# Patient Record
Sex: FEMALE | URBAN - METROPOLITAN AREA
[De-identification: names, ages, dates, MRNs, and addresses within clinical notes are randomized per-mention and may not be internally consistent; named-entity substitution may affect disease eponyms.]

---

## 2024-07-22 ENCOUNTER — APPOINTMENT (OUTPATIENT)
Dept: OBSTETRICS AND GYNECOLOGY | Facility: CLINIC | Age: 34
End: 2024-07-22
Payer: MEDICAID

## 2024-08-19 ENCOUNTER — APPOINTMENT (OUTPATIENT)
Dept: OBSTETRICS AND GYNECOLOGY | Facility: CLINIC | Age: 34
End: 2024-08-19
Payer: MEDICAID

## 2024-08-19 VITALS
HEIGHT: 62 IN | WEIGHT: 153 LBS | DIASTOLIC BLOOD PRESSURE: 60 MMHG | BODY MASS INDEX: 28.16 KG/M2 | SYSTOLIC BLOOD PRESSURE: 100 MMHG

## 2024-08-19 DIAGNOSIS — N63.20 MASS OF LEFT BREAST, UNSPECIFIED QUADRANT: ICD-10-CM

## 2024-08-19 DIAGNOSIS — N64.3 GALACTORRHEA: Primary | ICD-10-CM

## 2024-08-19 PROBLEM — N80.9 ENDOMETRIOSIS: Status: ACTIVE | Noted: 2024-08-19

## 2024-08-19 PROBLEM — F32.A ANXIETY AND DEPRESSION: Status: ACTIVE | Noted: 2021-08-23

## 2024-08-19 PROBLEM — M35.00 SJOGREN'S SYNDROME (MULTI): Status: ACTIVE | Noted: 2021-03-19

## 2024-08-19 PROBLEM — O92.70 DISORDER OF LACTATION (HHS-HCC): Status: RESOLVED | Noted: 2021-05-10 | Resolved: 2024-08-19

## 2024-08-19 PROBLEM — F43.10 PTSD (POST-TRAUMATIC STRESS DISORDER): Status: ACTIVE | Noted: 2024-08-19

## 2024-08-19 PROBLEM — F41.9 ANXIETY: Status: ACTIVE | Noted: 2021-08-23

## 2024-08-19 PROCEDURE — 99204 OFFICE O/P NEW MOD 45 MIN: CPT | Performed by: OBSTETRICS & GYNECOLOGY

## 2024-08-19 PROCEDURE — 3008F BODY MASS INDEX DOCD: CPT | Performed by: OBSTETRICS & GYNECOLOGY

## 2024-08-19 RX ORDER — DEXTROAMPHETAMINE SACCHARATE, AMPHETAMINE ASPARTATE, DEXTROAMPHETAMINE SULFATE AND AMPHETAMINE SULFATE 3.75; 3.75; 3.75; 3.75 MG/1; MG/1; MG/1; MG/1
TABLET ORAL
COMMUNITY
Start: 2023-01-04

## 2024-08-19 RX ORDER — EPINEPHRINE 0.3 MG/.3ML
INJECTION INTRAMUSCULAR
COMMUNITY

## 2024-08-19 RX ORDER — ALPRAZOLAM 1 MG/1
1 TABLET, EXTENDED RELEASE ORAL DAILY
COMMUNITY
Start: 2023-01-04

## 2024-08-19 RX ORDER — ALPRAZOLAM 0.25 MG/1
TABLET ORAL
COMMUNITY
Start: 2023-01-04

## 2024-08-19 NOTE — PROGRESS NOTES
Chief Complaint   Patient presents with    Breast Mass     New Patient Left Breast Lump        C/O left breast lump.  Patient states she had this lump x3 years and has not completed a follow up to her mammogram that she had at that time.  Patient has a strong family history of breast cancer.  Both of her sisters have tested negative for the Brca gene, however, she has not been tested her self.    Boyfriend in room with patient.       Para 3  Mom diagnosed with metastatic breast cancer at age 62, continuing to undergo treatment.    A first cousin's daughter was diagnosed with breast cancer at age 22.    Patient was seen for galactorrhea 3 years ago and had breast imaging at that time.  No follow-up since.  Has no further spontaneous galactorrhea.  Can still express clear to whitish nipple discharge from time to time.    For the last month, patient has noticed left breast lump, tender at times.  No  erythema.    PHYSICAL EXAM      PSYCH:  Pt is alert, oriented and cooperative    SKIN: warm, dry, w/o lesion    HEAD AND FACE:  External inspection of eyes, ears, functional cranial nerves normal and intact    THYROID:  No thyromegaly    CARDIOVASCULAR:  Warm and well Perfused    PULMONARY:  No respiratory distress    ABDOMEN:  soft, nontender.  No mass or organomegaly.      BREAST: Breasts are symmetric to inspection.  Bilaterally in the upper outer quadrant there is prominent gland tissue.  On the left this is somewhat more prominent, left greater than right.  No discrete mass palpable.  Overall low index of suspicion.    Assessment/Plan    Diagnoses and all orders for this visit:  Galactorrhea  L Breast Mass -clinical exam is suggestive of a low index of suspicion for neoplasm.  That said, it is a change for the patient.  Will proceed with checking TSH as well as prolactin.  Orders placed for bilateral diagnostic mammogram and left breast ultrasound.  Patient will schedule an reach out to me as these results are  done.  -     Thyroid Stimulating Hormone; Future  -     Prolactin; Future  Mass of left breast, unspecified quadrant  -     BI mammo bilateral diagnostic; Future  -     BI US breast limited left; Future